# Patient Record
Sex: MALE | Race: WHITE
[De-identification: names, ages, dates, MRNs, and addresses within clinical notes are randomized per-mention and may not be internally consistent; named-entity substitution may affect disease eponyms.]

---

## 2020-11-18 ENCOUNTER — HOSPITAL ENCOUNTER (OUTPATIENT)
Dept: HOSPITAL 92 - TBSIIMAG | Age: 70
Discharge: HOME | End: 2020-11-18
Attending: INTERNAL MEDICINE
Payer: MEDICARE

## 2020-11-18 DIAGNOSIS — S56.512A: ICD-10-CM

## 2020-11-18 DIAGNOSIS — R22.32: Primary | ICD-10-CM

## 2020-11-18 DIAGNOSIS — M25.742: ICD-10-CM

## 2020-11-18 NOTE — MRI
EXAM:  MRI Upper Ext Jt Lt WO Con



DATE:  11/18/2020 7:57 AM



INDICATION:  Palpable abnormality along the dorsal aspect of the left hand.



COMPARISON:  Left hand radiograph dated 11/09/2020 from [facility].



FINDING:  Surface marker was placed in the region of palpable interest overlying the dorsal aspect of
 the index metacarpal trapezoid articulation. There is a mildly prominent dorsal osteophyte at this

articulation that likely corresponds to the palpable abnormality. The adjacent ECRB and ECRL tendons 
are intact. The remaining extensor tendons appear intact. There is an intratendinous split tear

involving the visualized ECU tendon extending from the level of the distal ulna with reconstitution a
t the level of the proximal carpal row. The carpal tunnel and its contents are normal appearing.

The ulnar neural vasculature and median nerve is normal-appearing. There is remote appearing avulsion
 fracture involving the dorsal aspect of the lunate. The chronic ununited dorsal lunate fracture

fragment measures 6 mm and is best seen on image 20 of series 5. This is seen at the attachment point
 of the dorsal scapholunate band. The volar aspect of the scapholunate ligament and interosseous

segment appear intact. Lunatotriquetral ligaments are intact. The visualized extrinsic ligaments of t
he wrist appear intact. There is a small central perforation within the TFC.





IMPRESSION:

1. The palpable abnormality overlying the dorsal aspect of the left hand corresponds to a mildly prom
inent dorsal osteophyte at the index finger-trapezoid articulation.

2. Remote ununited fracture involving the dorsal lunate at the attachment point of the dorsal band of
 the scapholunate ligament. The interosseous and volar components of the scapholunate ligament are

intact.

3. Small central perforation of the TFC. Small intratendinous split tear of the ECU tendon.



Transcribed Date/Time: 11/18/2020 8:54 AM



Reported By: Israel Katz 

Electronically Signed:  11/18/2020 9:03 AM